# Patient Record
Sex: FEMALE | NOT HISPANIC OR LATINO | URBAN - NONMETROPOLITAN AREA
[De-identification: names, ages, dates, MRNs, and addresses within clinical notes are randomized per-mention and may not be internally consistent; named-entity substitution may affect disease eponyms.]

---

## 2017-07-14 PROBLEM — H52.4: Noted: 2017-07-14

## 2017-07-14 PROBLEM — H10.45: Noted: 2017-07-14

## 2020-11-13 ENCOUNTER — IMPORTED ENCOUNTER (OUTPATIENT)
Dept: URBAN - NONMETROPOLITAN AREA CLINIC 1 | Facility: CLINIC | Age: 45
End: 2020-11-13

## 2020-11-13 PROCEDURE — S0621 ROUTINE OPHTHALMOLOGICAL EXA: HCPCS

## 2020-11-13 NOTE — PATIENT DISCUSSION
Presbyopia OUDiscussed refractive status with patient. New glasses Rx given today. Continue to monitor. Allergic Conjuctivitis OUDiscussed diagnosis in detail with patient. Trace papillae noted on todays exam.Continue [place] QD OU. Continue to monitor.

## 2022-04-15 ASSESSMENT — VISUAL ACUITY
OD_CC: 20/25
OS_CC: 20/25

## 2022-04-15 ASSESSMENT — TONOMETRY
OD_IOP_MMHG: 15
OS_IOP_MMHG: 15

## 2023-11-21 ENCOUNTER — NEW PATIENT (OUTPATIENT)
Dept: URBAN - NONMETROPOLITAN AREA CLINIC 1 | Facility: CLINIC | Age: 48
End: 2023-11-21

## 2023-11-21 DIAGNOSIS — H52.4: ICD-10-CM

## 2023-11-21 PROCEDURE — S0620AEC ROUTINE OPH EXAM INCLUDES REF/ NEW PATIENT

## 2023-11-21 ASSESSMENT — VISUAL ACUITY
OS_SC: 20/20
OU_SC: 20/20
OD_SC: 20/20

## 2023-11-21 ASSESSMENT — TONOMETRY
OD_IOP_MMHG: 18
OS_IOP_MMHG: 18